# Patient Record
Sex: MALE | Race: WHITE | URBAN - METROPOLITAN AREA
[De-identification: names, ages, dates, MRNs, and addresses within clinical notes are randomized per-mention and may not be internally consistent; named-entity substitution may affect disease eponyms.]

---

## 2022-10-18 ENCOUNTER — OFFICE VISIT (OUTPATIENT)
Dept: URBAN - METROPOLITAN AREA CLINIC 13 | Facility: CLINIC | Age: 62
End: 2022-10-18
Payer: COMMERCIAL

## 2022-10-18 DIAGNOSIS — H59.022 CATARACT (LENS) FRAGMENTS IN EYE FOLLOWING CATARACT SURGERY, LEFT EYE: Primary | ICD-10-CM

## 2022-10-18 DIAGNOSIS — H33.8 OTHER RETINAL DETACHMENTS: ICD-10-CM

## 2022-10-18 DIAGNOSIS — H33.022 RETINAL DETACHMENT WITH MULTIPLE BREAKS, LEFT EYE: ICD-10-CM

## 2022-10-18 PROCEDURE — 92134 CPTRZ OPH DX IMG PST SGM RTA: CPT | Performed by: OPHTHALMOLOGY

## 2022-10-18 PROCEDURE — 92014 COMPRE OPH EXAM EST PT 1/>: CPT | Performed by: OPHTHALMOLOGY

## 2022-10-18 RX ORDER — DORZOLAMIDE HYDROCHLORIDE AND TIMOLOL MALEATE 20; 5 MG/ML; MG/ML
SOLUTION/ DROPS OPHTHALMIC
Qty: 5 | Refills: 2 | Status: ACTIVE
Start: 2022-10-18

## 2022-10-18 RX ORDER — DORZOLAMIDE HYDROCHLORIDE AND TIMOLOL MALEATE 20; 5 MG/ML; MG/ML
SOLUTION/ DROPS OPHTHALMIC
Qty: 5 | Refills: 2 | Status: INACTIVE
Start: 2022-10-18 | End: 2022-10-18

## 2022-10-18 RX ORDER — BRIMONIDINE TARTRATE 2 MG/ML
0.2 % SOLUTION/ DROPS OPHTHALMIC
Qty: 5 | Refills: 2 | Status: ACTIVE
Start: 2022-10-18

## 2022-10-18 ASSESSMENT — INTRAOCULAR PRESSURE
OS: 31
OD: 17

## 2022-10-18 NOTE — IMPRESSION/PLAN
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 No new holes or breaks. Doing well. RD warnings reviewed. Recommend observation today. Unknown prognosis

## 2022-10-18 NOTE — IMPRESSION/PLAN
Impression: Cataract (lens) fragments in eye following cataract surgery, left eye: H59.022. Plan: POD #1 s/p CE/AntVx. Unfortunately, RLF remain in the eye due to advanced status of the cataract. IOP elevated on Diamox, though improved from AM IOP after AC tap. Rec inc PF to q2hr. Will add Cosopt and Brimonidine. Will set up for PPV/phacofrag/ secondary IOL (sulcus vs ACIOL) in 1 wk in hope that corneal edema will be improved and secondary IOL can be placed. If IOP or inflammation cannot be managed medically, will move up date for surgery. RTC 2 days EP IOP check Consent for surgery OS : 25gPPV/phacofrag/ secondary IOL OS

## 2022-10-18 NOTE — IMPRESSION/PLAN
Impression: Age-related nuclear cataract, right eye: H25.11. Plan: Abel Landis for cataract surgery OD after OS is stabilized.

## 2022-10-18 NOTE — IMPRESSION/PLAN
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 OD. Status: Symptomatic.
-s/p laser OD. Plan: Exam and OCT reveal good laser treatment surrounding retinal tear/hole. No new holes or breaks. No ERM. RDW. Patient to call with any vision changes.

## 2022-10-20 ENCOUNTER — OFFICE VISIT (OUTPATIENT)
Dept: URBAN - METROPOLITAN AREA CLINIC 31 | Facility: CLINIC | Age: 62
End: 2022-10-20
Payer: COMMERCIAL

## 2022-10-20 DIAGNOSIS — H33.311 OPERCULUM OF RETINA W/O DETACHMENT OF RIGHT EYE: ICD-10-CM

## 2022-10-20 DIAGNOSIS — H25.11 AGE-RELATED NUCLEAR CATARACT, RIGHT EYE: ICD-10-CM

## 2022-10-20 PROCEDURE — 92012 INTRM OPH EXAM EST PATIENT: CPT | Performed by: OPHTHALMOLOGY

## 2022-10-20 ASSESSMENT — INTRAOCULAR PRESSURE
OS: 10
OD: 13

## 2022-10-20 NOTE — IMPRESSION/PLAN
Impression: Cataract (lens) fragments in eye following cataract surgery, left eye: H59.022. Plan: POD #3 s/p CE/AntVx. Unfortunately, RLF remain in the eye due to advanced status of the cataract. IOP improved on Diamox, Cosopt and Brimonidine. Rec cont PF to q2hr. Cont Cosopt and Brimonidine. Rec d/c diamox Will continue with surgery as planned PPV/phacofrag/ secondary IOL (sulcus vs ACIOL) next week. If IOP or inflammation cannot be managed medically, will move up date for surgery.  

surgery OS : 25gPPV/phacofrag/ secondary IOL OS

## 2022-10-20 NOTE — IMPRESSION/PLAN
Impression: Age-related nuclear cataract, right eye: H25.11. Plan: [id number] [address] Erazo for cataract surgery OD after OS is stabilized.

## 2022-10-26 NOTE — IMPRESSION/PLAN
Impression: S/P 25gPPV/phacofrag/ secondary IOL OS OS - 1 Day. Cataract (lens) fragments in eye following cataract surgery, left eye  H59.022. Plan: No infection. IOP OS elevated. Retina attached. Starting Ofloxacin and Prednisolone QID OS. Restarting Cosopt and Brimonidine BID OS Return in 1 week, OCT OU

## 2022-11-03 NOTE — IMPRESSION/PLAN
Impression: S/P 25gPPV/phacofrag/ ACIOL OS OS - 9 Days. Cataract (lens) fragments in eye following cataract surgery, left eye  H59.022. Plan: --Excellent post op course   
--Post operative instructions reviewed 
--Condition is improving 
-- Maintain PF QID
--Discontinue Ofloxacin 0.3%; d/c cosopt. Cont brimonidine -- will add eryth terra

RTC: 2 wks POS OS with OCT OU

## 2022-11-17 NOTE — IMPRESSION/PLAN
Impression: S/P 25gPPV/phacofrag/ ACIOL OS OS - 23 Days. Cataract (lens) fragments in eye following cataract surgery, left eye  H59.022. Plan: --Excellent post op course   
--Post operative instructions reviewed 
--Condition is improving
-- ok to taper PF
-- cont Brimonidine -- rec f/u with Dr. Raine Cruz to proceed with Cataract surgery OD.  

RTC: 3 mo with OCT OU

## 2023-07-18 NOTE — IMPRESSION/PLAN
Impression: Retinal detachment with multiple breaks, right eye: H33.021. 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 The diagnosis, natural history, and prognosis of RRD, as well as the R/B/A of the various surgical interventions were discussed. The importance of timely intervention was emphasized with the patient. Altitude precautions with a gas bubble were discussed, including the danger of loss of the eye with travel to a higher altitude or flying. The possible need to update spectacle correction if a scleral buckle is used was explained. The 10% risk of re-detachment and PVR were explained to the patient. The patient understands that the vision usually improves gradually over a period of months to years, but may not improve to prior levels, especially when the macula is involved in the RD. The patient elects to proceed with retinal detachment repair.   

Will plan on 25gPPV/EL/Gas x mac on RD

## 2023-07-18 NOTE — IMPRESSION/PLAN
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 No new holes or breaks. Doing well. RD warnings reviewed.  Excellent that vision improved to 20/50

## 2023-07-18 NOTE — IMPRESSION/PLAN
Impression: Cataract (lens) fragments in eye following cataract surgery, left eye  H59.022.
-s/p 25G PPV/PHACOFRAG/ACIOL (10/25/2022)-SI Plan: Looks great

## 2023-07-20 NOTE — IMPRESSION/PLAN
Impression: S/P 25G PPV/EL/GAS OD - 1 Day. Retinal detachment with multiple breaks, right eye  H33.021.  Plan: --Excellent post op course   
--Condition is improving
--No s/s of infection 
--IOP normal 

RTC: 1wks POS OS with OCT OU